# Patient Record
(demographics unavailable — no encounter records)

---

## 2024-11-06 NOTE — PHYSICAL EXAM
[Well Developed] : well developed [Well Nourished] : well nourished [No Carotid Bruit] : no carotid bruit [Normal S1, S2] : normal S1, S2 [No Rub] : no rub [Clear Lung Fields] : clear lung fields [Good Air Entry] : good air entry [Soft] : abdomen soft [Present] : present bilaterally [Trace] : Right: trace [1+] : Left:1+

## 2024-11-06 NOTE — HISTORY OF PRESENT ILLNESS
[FreeTextEntry1] : 92M with CAD s/p PCI, AF, HFrEF LVEF 25-30%, [employer] ICD with gen change 2014, AFib on apixaban, T2DM, hip fracture 11/2021, urinary retention, here for follow-up.   11/6/24 Feels well. No chest pain, shortness of breath, palpitations, lightheadedness/dizziness, pre-syncope/syncope, or lower extremity edema. No orthopnea, PND No falls. No hospitalizations.   4/15/24 with NP Rusty: Reports feeling well. No chest pain, SOB, palpitations, lightheadedness/dizziness, syncope, or lower extremity edema. No melena or hematuria.   10/30/23 Being started on Augmentin x1 week for UTI Goes for walk 3x per week  No chest pain, shortness of breath, palpitations, lightheadedness/dizziness, pre-syncope/syncope, or lower extremity edema. No melena or hematuria.   02/06/2023: Patient denies chest pain, SOB, dizziness. Chronic LE edema L>R. No falls, melena, hematuria, epistaxis. Cholesterol is well controlled. In Sinus bradycardia. Appears euvolemic. Follows with PCP for anemia.  Cr 1.8, GFR 35. Hg 9.6/hct 31.5  8/25/22 Feels well. Home health aid takes him out 5x per week. No chest pain, shortness of breath, palpitations, lightheadedness/dizziness, pre-syncope/syncope. Chronic LE edema L>R. Recent LE venous duplex negative per patient/daughter.   Compliant with medications.   No falls. No epistaxis, melena or hematuria.   6/30/22  S/p PT at home, pt walks QD without cardiac issues. Pt denies any SOB, No chest pain, no palpitations.  Chronic BLE edema more to L leg chronic. Not taking Lasix. S/p hitting L shin area 4 m ago, small dry scab.  S/p EP evaluation, declines Watchman procedure.  On Eliquis 5mg. Denies epistaxis, melena, hematuria.   4/25/22 PT at home. No chest pain, shortness of breath, palpitations, lightheadedness/dizziness, pre-syncope/syncope. One pillow. Left leg swelling four years.   Prostate trouble, TURP Chronic Duran catheter, changed q6 weeks

## 2024-11-06 NOTE — ASSESSMENT
[FreeTextEntry1] : Assessment: #CAD s/p PCI #Chronic AFib on apixaban #HFrEF LVEF 25-30% - Well compensated, Appears euvolemic #s/p [employer] AICD #DLD - at goal  - 8/2022 , TG 90, HDL 47, LDL 57 on rosuva - 1/2024: , TG 78, HDL 44, LDL 65 on rosuva 20 #T2DM #Hxo TIA #Fall with hip fracture 11/2021 #Anemia - 3/2/22 Hgb 8.1 - 6/30/22 Hgb 10.3 - 08/29/2022 Hgb 9.6  9/2024 , TG 80, HDL 38, LDL 54 Cr 1.71, K 5.1 A1c 6.4% Hgb 11.2  Plan: - Monitor K, labs planned with PCP, low-potassium diet - Cont Eliquis 2.5 mg BID - Reviewed s/s bleeding, does not want ALESSANDRO occlusion device - Continue Entresto 49-51 mg BID, metop succ 50 mg daily - Continue rosuvastatin 20 mg - Continue Lasix 40 PRN for leg swelling - F/u with EP for device monitoring - Return to clinic in 6 months, labs before next visit

## 2024-11-06 NOTE — REVIEW OF SYSTEMS
[Lower Ext Edema] : lower extremity edema [Negative] : Heme/Lymph [Chills] : no chills [SOB] : no shortness of breath [Dyspnea on exertion] : not dyspnea during exertion [Chest Discomfort] : no chest discomfort [Syncope] : no syncope [Cough] : no cough [Blood in stool] : no blood in stoo [Dizziness] : no dizziness

## 2024-11-06 NOTE — CARDIOLOGY SUMMARY
[de-identified] : EKG 4/25/22 v-paced with PACs EKG 6/30/22 v-Paced HR 55  EKG 8/25/22 v-paced 55 bpm EKG 4/15/24: V-paced with PVCs 63bpm  EKG 10/30/22 v-paced PVCs, AFib EKG 11/6/2024 v paced 55 bpm [de-identified] : 5/17/22 Lyndsey scan Negative for ischemia EF 40 % \par  infarction in the distribution of the mid to distal RCA with min superimposed ischemia \par  associated with dilatation of LV Septal Septal conduction defect.

## 2025-02-03 NOTE — PHYSICAL EXAM
[General Appearance - Well Developed] : well developed [Normal Appearance] : normal appearance [Well Groomed] : well groomed [General Appearance - Well Nourished] : well nourished [No Deformities] : no deformities [General Appearance - In No Acute Distress] : no acute distress [Heart Sounds] : normal S1 and S2 [Murmurs] : no murmurs present [Arterial Pulses Normal] : the arterial pulses were normal [] : no respiratory distress [Respiration, Rhythm And Depth] : normal respiratory rhythm and effort [Exaggerated Use Of Accessory Muscles For Inspiration] : no accessory muscle use [Auscultation Breath Sounds / Voice Sounds] : lungs were clear to auscultation bilaterally [Left Infraclavicular] : left infraclavicular area [Clean] : clean [Dry] : dry [Well-Healed] : well-healed [Abdomen Soft] : soft [Nail Clubbing] : no clubbing of the fingernails [FreeTextEntry1] : walks with cane

## 2025-02-03 NOTE — CARDIOLOGY SUMMARY
[de-identified] : 7/24/2023 AFib with slow VR, V-paced (HR 55 bpm)\par  5/15/2023 AFib with slow VR, V-paced (HR 54 bpm)\par  6/20/2022 AFib with slow VR, V-paced (HR 57 bpm)\par  5/2/2022 AFib with slow VR, V-paced (HR 52 bpm). [de-identified] : 7/12/2021 EF 25-30% Mod-severe LVE. Severe LAE. Mild-mod AI.  [de-identified] : 5/17/2022 NST EF 40% Mod to large in size severe in severity inferoapical defect with minimal reversibility. Dilated LV at rest and stress. [de-identified] : S/P PCI OM1 and 2,QUAN LAD Mod lesion in RCA not done

## 2025-02-03 NOTE — ASSESSMENT
[FreeTextEntry1] : # DCM (EF 25-30%) s/p DC ICD with inappropriate ICD shocks x 2 on 1/14/2022 s/p generator change 8/3/2023  - Cont GDMT. Declined upgrade to CRT. - Interrogation as above. No events.  - CorVue below threshold. No clinical s/sx of heart failure. Taking medications as prescribed.  - The patient is on remote and transmitting.   # Chronic AFib with slow VR - Mostly rate controlled  - Cont Metoprolol Succinate ER 50 mg daily - Cont Eliquis 2.5 mg PO BID given AGE > 80 and Cr > 1.5. Labs reviewed from 9/2024. Patient denies s/sx of bleeding. - Not interested in ALESSANDRO closure device.   # NSVT - No new episodes. - Continue Toprol XL 50mg QD.   # CHF - Continue GDMT - Monitor daily weights. - 2g Na diet enforced.   I have also advised the patient to go to the nearest emergency room if he experiences any chest pain, dyspnea, syncope, or has any other compelling symptoms.  Follow up 6-9 months with ACP

## 2025-02-03 NOTE — HISTORY OF PRESENT ILLNESS
[de-identified] : Cardiologist: Dr. Dalton -->  Dr. Reed   93 yo M with history of MI s/p PCI, ? VT s/p DC [employer] ICD (implanted 9/15/2006 by Dr. Erasto Lorenzo at [facility]), chronic AFib for several years (maybe since 2016?) on Eliquis, BPH with multiple TURPs, here to establish care in our device clinic. Of note, pt was previously on coumadin but had diverticular bleed due to diverticulitis. He was started on low dose Eliquis and has not had any bleeding issues. He does have h/o TIA (right arm weakness) several years ago.   He had a mechanical fall in Nov 2022 and was admitted to [facility]. He had hip fracture and underwent hip surgery. [facility]. His wife passed away in 2023. He walks with cane. Has a Duran in place for BPH.   He is here for s/p ICD generator change for follow up. The patient is feeling well and has no cardiac complaints. Denies CP, palpitations, SOB, dizziness, QUINONES, PND, syncope.

## 2025-02-03 NOTE — PROCEDURE
[Atrial Fibrillation] : atrial fibrillation [See Device Printout] : See device printout [ICD] : Implantable cardioverter-defibrillator [VVIR] : VVIR [Charge Time: ___ sec] : charge time was [unfilled] seconds [Threshold Testing Performed] : Threshold testing was performed [Lead Imp:  ___ohms] : lead impedance was [unfilled] ohms [Sensing Amplitude ___mv] : sensing amplitude was [unfilled] mv [___V @] : [unfilled] V [___ ms] : [unfilled] ms [None] : none [Asense-Vpace ___ %] : Asense-Vpace [unfilled]% [Apace-Vpace ___ %] : Apace-Vpace [unfilled]% [de-identified] : slow VR 30s [de-identified] : ABBOTT [de-identified] : 5927-40V [de-identified] : [id number] [de-identified] : 8/3/2023 [de-identified] : 55 [de-identified] : 7.1 years [de-identified] : Normal Device Function CorVue elevated from January 11 for 11 days, now below threshold No events AT/AF [place] >99%

## 2025-02-27 NOTE — PROCEDURE
[FreeTextEntry1] :  Aseptic mechanical debridement of thickened, elongated, dystrophic, painful mycotic toenails times ten.

## 2025-07-02 NOTE — PHYSICAL EXAM
[General Appearance - Well Developed] : well developed [Normal Appearance] : normal appearance [Well Groomed] : well groomed [General Appearance - Well Nourished] : well nourished [No Deformities] : no deformities [General Appearance - In No Acute Distress] : no acute distress [Heart Sounds] : normal S1 and S2 [Murmurs] : no murmurs present [Arterial Pulses Normal] : the arterial pulses were normal [] : no respiratory distress [Respiration, Rhythm And Depth] : normal respiratory rhythm and effort [Exaggerated Use Of Accessory Muscles For Inspiration] : no accessory muscle use [Auscultation Breath Sounds / Voice Sounds] : lungs were clear to auscultation bilaterally [Left Infraclavicular] : left infraclavicular area [Clean] : clean [Dry] : dry [Well-Healed] : well-healed [Abdomen Soft] : soft [Nail Clubbing] : no clubbing of the fingernails [FreeTextEntry1] : irregular rhythm, 2+ bilateral lower extremity edema

## 2025-07-02 NOTE — PROCEDURE
[Atrial Fibrillation] : atrial fibrillation [ICD] : Implantable cardioverter-defibrillator [VVIR] : VVIR [Voltage: ___ volts] : Voltage was [unfilled] volts [Charge Time: ___ sec] : charge time was [unfilled] seconds [Longevity: ___ months] : The estimated remaining battery life is [unfilled] months [Normal] : The battery status is normal. [Threshold Testing Performed] : Threshold testing was performed [Lead Imp:  ___ohms] : lead impedance was [unfilled] ohms [Sensing Amplitude ___mv] : sensing amplitude was [unfilled] mv [___V @] : [unfilled] V [___ ms] : [unfilled] ms [Programmed for Longevity] : output reprogrammed for improved battery longevity [Pace ___ %] : Pace [unfilled]% [See Device Printout] : See device printout [de-identified] : [employer] [de-identified] : [id number] [de-identified] : [id number] [de-identified] : 8/3/2023 [de-identified] : 55 [de-identified] : increased lower rate to 60 bpm [de-identified] : 100% AF.  No other episodes CorVue is stable. On remote.

## 2025-07-02 NOTE — DISCUSSION/SUMMARY
[AICD Function Normal] : normal AICD function [FreeTextEntry1] : We had an extensive conversation regarding the nature of atrial fibrillation, including potential etiologies, underlying pathophysiology and natural history of the disease. In addition, the potential risk of thromboembolic events and assessment of that risk were discussed. I have emphasized the importance of continuing anticoagulation.   We discussed the risks/benefits/alternatives, nature of procedure, and follow up care after Watchman is implanted. We discussed the risks including but not limited to bleeding, hematoma, injury to vessels and heart, perforation, tamponade, pneumothorax, infection, embolization, malfunction, and rare risks of stroke/heart attack/death. Patient IS NOT interested in any more picture.

## 2025-07-02 NOTE — HISTORY OF PRESENT ILLNESS
[de-identified] : Cardiologist: Dr. Dalton -->  Dr. Reed   94 yo M with history of MI s/p PCI, ? VT s/p DC [employer] ICD (implanted 9/15/2006 by Dr. Erasto Lorenzo at [facility]), chronic AFib for several years (maybe since 2016?) on Eliquis, BPH with multiple TURPs, here to establish care in our device clinic. Of note, pt was previously on coumadin but had diverticular bleed due to diverticulitis. He was started on low dose Eliquis and has not had any bleeding issues. He does have h/o TIA (right arm weakness) several years ago.   He had a mechanical fall in Nov 2022 and was admitted to [facility]. He had hip fracture and underwent hip surgery. He was at [facility] rehab from 11/29-1/14/22. His wife passed away in 2023. He walks with cane. Has a Duran in place for BPH.   7/2/2025 Here for routine follow up. Had a fall a few months ago that resulted in a broken left arm (now out of sling). He is not sure what happened, but he has had 3 falls in the last 3 mo per daughter. Most recent fall was in mid-May. He was walking with a walker when he fell. Unsure if he lost consciousness as this was unwitnessed. He was hospitalized for 1 week at [facility]. He did not have any cardiac testing done per daughter and he is currently at rehab. Patient c/o occ dizziness. Has had issues with low BP so BB was stopped. He denies CP, palpitations, dyspnea.

## 2025-07-02 NOTE — CARDIOLOGY SUMMARY
[de-identified] : 7/2/2025 AF with slow VR, V-paced (HR 55 bpm) 7/24/2023 AFib with slow VR, V-paced (HR 55 bpm) 5/15/2023 AFib with slow VR, V-paced (HR 54 bpm) 6/20/2022 AFib with slow VR, V-paced (HR 57 bpm) 5/2/2022 AFib with slow VR, V-paced (HR 52 bpm). [de-identified] : 7/12/2021 EF 25-30% Mod-severe LVE. Severe LAE. Mild-mod AI.  [de-identified] : 5/17/2022 NST EF 40% Mod to large in size severe in severity inferoapical defect with minimal reversibility. Dilated LV at rest and stress. [de-identified] : S/P PCI OM1 and 2,QUAN LAD Mod lesion in RCA not done

## 2025-07-02 NOTE — ASSESSMENT
[FreeTextEntry1] : # DCM (EF 25-30%) s/p DC ICD with inappropriate ICD shocks x 2 on 1/14/2022 s/p generator change 8/3/2023 - Cont GDMT. Declined upgrade to CRT. - Interrogation as above. 100% AF (known). No events. Increase lower rate to 60 bpm.  - CorVue below threshold. No clinical s/sx of heart failure. Taking medications as prescribed.  - The patient is on remote and transmitting.   # Chronic AFib with slow VR - Mostly rate controlled  - BB stopped due to hypotension - Cont Eliquis 2.5 mg PO BID given AGE > 80 and Cr > 1.5. Obtain recent labs. Patient denies s/sx of bleeding. - Has had several falls. We again discussed ALESSANDRO occlusion device. Patient is not interested.   # NSVT - No new episodes.  # CHF - Continue GDMT - Monitor daily weights. - 2g Na diet enforced.   I have also advised the patient to go to the nearest emergency room if he experiences any chest pain, dyspnea, syncope, or has any other compelling symptoms.  Follow up 6 months with ACP